# Patient Record
Sex: MALE | Race: WHITE | HISPANIC OR LATINO | URBAN - METROPOLITAN AREA
[De-identification: names, ages, dates, MRNs, and addresses within clinical notes are randomized per-mention and may not be internally consistent; named-entity substitution may affect disease eponyms.]

---

## 2023-03-15 ENCOUNTER — EMERGENCY (EMERGENCY)
Facility: HOSPITAL | Age: 38
LOS: 1 days | Discharge: HOME | End: 2023-03-15
Attending: STUDENT IN AN ORGANIZED HEALTH CARE EDUCATION/TRAINING PROGRAM | Admitting: STUDENT IN AN ORGANIZED HEALTH CARE EDUCATION/TRAINING PROGRAM
Payer: MEDICAID

## 2023-03-15 VITALS
RESPIRATION RATE: 16 BRPM | SYSTOLIC BLOOD PRESSURE: 117 MMHG | OXYGEN SATURATION: 99 % | WEIGHT: 145.06 LBS | HEART RATE: 80 BPM | TEMPERATURE: 99 F | DIASTOLIC BLOOD PRESSURE: 76 MMHG

## 2023-03-15 VITALS
DIASTOLIC BLOOD PRESSURE: 76 MMHG | RESPIRATION RATE: 16 BRPM | HEART RATE: 98 BPM | SYSTOLIC BLOOD PRESSURE: 127 MMHG | OXYGEN SATURATION: 96 % | TEMPERATURE: 99 F

## 2023-03-15 LAB
ALBUMIN SERPL ELPH-MCNC: 3.7 G/DL — SIGNIFICANT CHANGE UP (ref 3.3–5)
ALP SERPL-CCNC: 77 U/L — SIGNIFICANT CHANGE UP (ref 40–120)
ALT FLD-CCNC: 36 U/L — SIGNIFICANT CHANGE UP (ref 12–78)
AMYLASE P1 CFR SERPL: 38 U/L — SIGNIFICANT CHANGE UP (ref 25–125)
ANION GAP SERPL CALC-SCNC: 5 MMOL/L — SIGNIFICANT CHANGE UP (ref 5–17)
APPEARANCE UR: CLEAR — SIGNIFICANT CHANGE UP
AST SERPL-CCNC: 21 U/L — SIGNIFICANT CHANGE UP (ref 15–37)
BACTERIA # UR AUTO: ABNORMAL
BASOPHILS # BLD AUTO: 0.02 K/UL — SIGNIFICANT CHANGE UP (ref 0–0.2)
BASOPHILS NFR BLD AUTO: 0.2 % — SIGNIFICANT CHANGE UP (ref 0–2)
BILIRUB SERPL-MCNC: 0.7 MG/DL — SIGNIFICANT CHANGE UP (ref 0.2–1.2)
BILIRUB UR-MCNC: NEGATIVE — SIGNIFICANT CHANGE UP
BUN SERPL-MCNC: 15 MG/DL — SIGNIFICANT CHANGE UP (ref 7–23)
CALCIUM SERPL-MCNC: 7.9 MG/DL — LOW (ref 8.5–10.1)
CHLORIDE SERPL-SCNC: 108 MMOL/L — SIGNIFICANT CHANGE UP (ref 96–108)
CO2 SERPL-SCNC: 22 MMOL/L — SIGNIFICANT CHANGE UP (ref 22–31)
COLOR SPEC: SIGNIFICANT CHANGE UP
CREAT SERPL-MCNC: 0.75 MG/DL — SIGNIFICANT CHANGE UP (ref 0.5–1.3)
DIFF PNL FLD: ABNORMAL
EGFR: 118 ML/MIN/1.73M2 — SIGNIFICANT CHANGE UP
EOSINOPHIL # BLD AUTO: 0.01 K/UL — SIGNIFICANT CHANGE UP (ref 0–0.5)
EOSINOPHIL NFR BLD AUTO: 0.1 % — SIGNIFICANT CHANGE UP (ref 0–6)
EPI CELLS # UR: NEGATIVE — SIGNIFICANT CHANGE UP
GLUCOSE SERPL-MCNC: 116 MG/DL — HIGH (ref 70–99)
GLUCOSE UR QL: NEGATIVE — SIGNIFICANT CHANGE UP
HCT VFR BLD CALC: 45.6 % — SIGNIFICANT CHANGE UP (ref 39–50)
HGB BLD-MCNC: 15.1 G/DL — SIGNIFICANT CHANGE UP (ref 13–17)
IMM GRANULOCYTES NFR BLD AUTO: 0.5 % — SIGNIFICANT CHANGE UP (ref 0–0.9)
KETONES UR-MCNC: NEGATIVE — SIGNIFICANT CHANGE UP
LEUKOCYTE ESTERASE UR-ACNC: NEGATIVE — SIGNIFICANT CHANGE UP
LIDOCAIN IGE QN: 81 U/L — SIGNIFICANT CHANGE UP (ref 73–393)
LYMPHOCYTES # BLD AUTO: 0.73 K/UL — LOW (ref 1–3.3)
LYMPHOCYTES # BLD AUTO: 6.7 % — LOW (ref 13–44)
MCHC RBC-ENTMCNC: 29.5 PG — SIGNIFICANT CHANGE UP (ref 27–34)
MCHC RBC-ENTMCNC: 33.1 GM/DL — SIGNIFICANT CHANGE UP (ref 32–36)
MCV RBC AUTO: 89.1 FL — SIGNIFICANT CHANGE UP (ref 80–100)
MONOCYTES # BLD AUTO: 0.84 K/UL — SIGNIFICANT CHANGE UP (ref 0–0.9)
MONOCYTES NFR BLD AUTO: 7.7 % — SIGNIFICANT CHANGE UP (ref 2–14)
NEUTROPHILS # BLD AUTO: 9.3 K/UL — HIGH (ref 1.8–7.4)
NEUTROPHILS NFR BLD AUTO: 84.8 % — HIGH (ref 43–77)
NITRITE UR-MCNC: NEGATIVE — SIGNIFICANT CHANGE UP
NRBC # BLD: 0 /100 WBCS — SIGNIFICANT CHANGE UP (ref 0–0)
PH UR: 6.5 — SIGNIFICANT CHANGE UP (ref 5–8)
PLATELET # BLD AUTO: 249 K/UL — SIGNIFICANT CHANGE UP (ref 150–400)
POTASSIUM SERPL-MCNC: 3.4 MMOL/L — LOW (ref 3.5–5.3)
POTASSIUM SERPL-SCNC: 3.4 MMOL/L — LOW (ref 3.5–5.3)
PROT SERPL-MCNC: 7.7 G/DL — SIGNIFICANT CHANGE UP (ref 6–8.3)
PROT UR-MCNC: NEGATIVE — SIGNIFICANT CHANGE UP
RAPID RVP RESULT: SIGNIFICANT CHANGE UP
RBC # BLD: 5.12 M/UL — SIGNIFICANT CHANGE UP (ref 4.2–5.8)
RBC # FLD: 12.1 % — SIGNIFICANT CHANGE UP (ref 10.3–14.5)
RBC CASTS # UR COMP ASSIST: SIGNIFICANT CHANGE UP /HPF (ref 0–4)
SARS-COV-2 RNA SPEC QL NAA+PROBE: SIGNIFICANT CHANGE UP
SODIUM SERPL-SCNC: 135 MMOL/L — SIGNIFICANT CHANGE UP (ref 135–145)
SP GR SPEC: 1 — LOW (ref 1.01–1.02)
UROBILINOGEN FLD QL: NEGATIVE — SIGNIFICANT CHANGE UP
WBC # BLD: 10.95 K/UL — HIGH (ref 3.8–10.5)
WBC # FLD AUTO: 10.95 K/UL — HIGH (ref 3.8–10.5)
WBC UR QL: SIGNIFICANT CHANGE UP

## 2023-03-15 PROCEDURE — 80053 COMPREHEN METABOLIC PANEL: CPT

## 2023-03-15 PROCEDURE — 81001 URINALYSIS AUTO W/SCOPE: CPT

## 2023-03-15 PROCEDURE — 96361 HYDRATE IV INFUSION ADD-ON: CPT

## 2023-03-15 PROCEDURE — 82150 ASSAY OF AMYLASE: CPT

## 2023-03-15 PROCEDURE — 74177 CT ABD & PELVIS W/CONTRAST: CPT | Mod: 26,MA

## 2023-03-15 PROCEDURE — 85025 COMPLETE CBC W/AUTO DIFF WBC: CPT

## 2023-03-15 PROCEDURE — 96374 THER/PROPH/DIAG INJ IV PUSH: CPT | Mod: XU

## 2023-03-15 PROCEDURE — 87086 URINE CULTURE/COLONY COUNT: CPT

## 2023-03-15 PROCEDURE — 99053 MED SERV 10PM-8AM 24 HR FAC: CPT

## 2023-03-15 PROCEDURE — 99284 EMERGENCY DEPT VISIT MOD MDM: CPT | Mod: 25

## 2023-03-15 PROCEDURE — 36415 COLL VENOUS BLD VENIPUNCTURE: CPT

## 2023-03-15 PROCEDURE — 0225U NFCT DS DNA&RNA 21 SARSCOV2: CPT

## 2023-03-15 PROCEDURE — 83690 ASSAY OF LIPASE: CPT

## 2023-03-15 PROCEDURE — 99285 EMERGENCY DEPT VISIT HI MDM: CPT

## 2023-03-15 PROCEDURE — 96375 TX/PRO/DX INJ NEW DRUG ADDON: CPT

## 2023-03-15 PROCEDURE — 74177 CT ABD & PELVIS W/CONTRAST: CPT | Mod: MA

## 2023-03-15 RX ORDER — ONDANSETRON 8 MG/1
4 TABLET, FILM COATED ORAL ONCE
Refills: 0 | Status: COMPLETED | OUTPATIENT
Start: 2023-03-15 | End: 2023-03-15

## 2023-03-15 RX ORDER — SODIUM CHLORIDE 9 MG/ML
1000 INJECTION INTRAMUSCULAR; INTRAVENOUS; SUBCUTANEOUS ONCE
Refills: 0 | Status: COMPLETED | OUTPATIENT
Start: 2023-03-15 | End: 2023-03-15

## 2023-03-15 RX ORDER — MORPHINE SULFATE 50 MG/1
4 CAPSULE, EXTENDED RELEASE ORAL ONCE
Refills: 0 | Status: DISCONTINUED | OUTPATIENT
Start: 2023-03-15 | End: 2023-03-15

## 2023-03-15 RX ORDER — POTASSIUM CHLORIDE 20 MEQ
40 PACKET (EA) ORAL ONCE
Refills: 0 | Status: COMPLETED | OUTPATIENT
Start: 2023-03-15 | End: 2023-03-15

## 2023-03-15 RX ADMIN — MORPHINE SULFATE 4 MILLIGRAM(S): 50 CAPSULE, EXTENDED RELEASE ORAL at 04:27

## 2023-03-15 RX ADMIN — SODIUM CHLORIDE 1000 MILLILITER(S): 9 INJECTION INTRAMUSCULAR; INTRAVENOUS; SUBCUTANEOUS at 03:06

## 2023-03-15 RX ADMIN — SODIUM CHLORIDE 1000 MILLILITER(S): 9 INJECTION INTRAMUSCULAR; INTRAVENOUS; SUBCUTANEOUS at 01:00

## 2023-03-15 RX ADMIN — Medication 40 MILLIEQUIVALENT(S): at 04:51

## 2023-03-15 RX ADMIN — SODIUM CHLORIDE 1000 MILLILITER(S): 9 INJECTION INTRAMUSCULAR; INTRAVENOUS; SUBCUTANEOUS at 02:00

## 2023-03-15 RX ADMIN — SODIUM CHLORIDE 1000 MILLILITER(S): 9 INJECTION INTRAMUSCULAR; INTRAVENOUS; SUBCUTANEOUS at 04:51

## 2023-03-15 RX ADMIN — ONDANSETRON 4 MILLIGRAM(S): 8 TABLET, FILM COATED ORAL at 03:07

## 2023-03-15 RX ADMIN — MORPHINE SULFATE 4 MILLIGRAM(S): 50 CAPSULE, EXTENDED RELEASE ORAL at 03:06

## 2023-03-15 NOTE — ED ADULT NURSE NOTE - OBJECTIVE STATEMENT
received pt stable alert oriented x4 no distress c/o abd pain blood work drawned and ivf in progress awaiting MD evaluation

## 2023-03-15 NOTE — ED ADULT NURSE NOTE - IS THE PATIENT ABLE TO BE SCREENED?
+toleration of puree laced w/ blue dye w/o immediate overt s/s of penetration/aspiration. No blue dye noted in tracheal secretions upon suctioning post po trials. Yes

## 2023-03-15 NOTE — ED PROVIDER NOTE - CHPI ED SYMPTOMS NEG
no abdominal distension/no blood in stool/no dysuria/no fever/no hematuria/no vomiting/no burning urination

## 2023-03-15 NOTE — ED PROVIDER NOTE - GASTROINTESTINAL, MLM
Abdomen soft, non-distended, generalized periumbilical tenderness, no rebound, no guarding. +BS, no CVA tenderness, no pulsatile mass

## 2023-03-15 NOTE — ED PROVIDER NOTE - PATIENT PORTAL LINK FT
You can access the FollowMyHealth Patient Portal offered by [facility] by registering at the following website: [email or web address]. By joining [employer]’s FollowMyHealth portal, you will also be able to view your health information using other applications (apps) compatible with our system.

## 2023-03-15 NOTE — ED PROVIDER NOTE - NSFOLLOWUPINSTRUCTIONS_ED_ALL_ED_FT
Please drink plenty of clears.  Do not return to the ER for persistent abdominal pain, vomiting, fevers, dehydration, inability to tolerate liquids, or any other concerns.       Gastroenteritis    LO QUE NECESITA SABER:    ¿Qué es la gastroenteritis?La gastroenteritis, o gripe estomacal, es sohan infección del estómago y los intestinos. Es provocada por sohan bacteria, parásitos o virus.  Tracto digestivo         ¿Qué aumenta mi riesgo de gastroenteritis?  •Contacto cercano con sohan persona o animal infectado      •Intoxicación alimentaria, shavon de huevos, verduras crudas, mariscos o carne que no está cocinada completamente      •Grant agua contaminada, shavon al acampar o salir de viaje      ¿Cuáles son los signos y síntomas de la gastroenteritis?  •Diarrea o gas      •Náusea, vómitos o apetito deficiente      •Calambres, dolor o gorgoteo abdominales      •Fiebre      •Cansancio o debilidad      •Leela de nidhi o musculares con cualquiera de los síntomas anteriores      ¿Cómo se diagnostica y trata la gastroenteritis?Azar médico lo examinará. Buscará signos de deshidratación. Le preguntará con qué frecuencia ha tenido vómitos o diarrea. Usted podría necesitar que le tomen sohan muestra de brandy o de evacuaciones intestinales para analizarla y determinar si contiene el germen causante de azar gastroenteritis. La gastroenteritis con frecuencia se jereimah por sí micky. Es posible que le den medicamento para reducir o detener la diarrea y los vómitos. Es posible que también necesite medicamentos para tratar sohan infección a causa de sohan bacteria o parásitos.    ¿Cómo puedo controlar mi gastroenteritis?  •Brogden líquidos shavon se le haya indicado.Pregunte a azar médico qué cantidad de líquido debe beber a diario y qué líquidos le recomienda. Es posible que también necesite grant sohan solución de rehidratación oral (SRO). Sohan SRO contiene la cantidad adecuada de azúcar, sal y minerales en agua para reponer los líquidos corporales.      •Consuma alimentos blandos.Cuando usted sienta hambre, empiece a comer alimentos suaves y blandos. Ejemplos son los plátanos, sopas claras, corina y puré de manzana. No consuma productos lácteos, alcohol, bebidas azucaradas, o bebidas con cafeína hasta que se sienta mejor.      •Descanse el mayor tiempo posible.Cuando se empiece a sentir mejor, comience poco a poco a hacer más cada día.      ¿Cómo puedo evitar la gastroenteritis?La gastroenteritis se puede propagar fácilmente. Manténgase usted, azar cecilio y glendy alrededores limpios para ayudar a evitar la propagación de la gastroenteritis:   •Lávese las jose frecuentemente.Utilice agua y jabón. Lávese las jose después de usar el baño, cambiarle el pañal a un navdeep o estornudar. Lávese las jose antes de comer o preparar alimentos.   Lavado de jose           •Limpie las superficies y lave la ropa con frecuencia.Lave azar ropa y glendy toallas por separado del christiano de la ropa. Limpie las superficies de azar hogar con limpiador antibacterial o con blanqueador.      •Lave y cocine juno los alimentos.Lave las verduras crudas antes de cocinar. Cocine juno las naty, pescados y huevos. No utilice los mismos platos para las naty crudas que para otros alimentos. Ponga en el refrigerador inmediatamente cualquier alimento que haya sobrado.      •Esté alerta cuando usted vaya de campamento o cuando viaje.Solamente tome agua limpia. No tome agua de los luis manuel o maisha a menos que usted purifique o hierva el agua alisa. Cuando viaje, tome agua embotellada y no le ponga hielo. No coma fruta con la cáscara. No coma pescado crudo o naty que no están cocinadas completamente.      Llame al 911 en radha de presentar lo siguiente:  •Usted tiene dificultad para respirar o pulso acelerado.          ¿Cuándo vianney buscar atención inmediata?  •Usted ve brandy en azar diarrea.      •Usted no puede dejar de vomitar.      •Usted no ha orinado en 12 horas.      •Usted siente que se va a desmayar.      ¿Cuándo viannye comunicarme con mi médico?  •Tiene fiebre.      •Usted continúa con vómitos o diarrea aún después del tratamiento.      •Usted ve lombrices en azar diarrea.      •Azar boca u ojos están secos. Usted no está orinando tanto o con la misma frecuencia.      •Usted tiene preguntas o inquietudes acerca de azar condición o cuidado.      ACUERDOS SOBRE AZAR CUIDADO:    Usted tiene el derecho de ayudar a planear azar cuidado. Aprenda todo lo que pueda sobre azar condición y shavon darle tratamiento. Discuta glendy opciones de tratamiento con glendy médicos para decidir el cuidado que usted desea recibir. Usted siempre tiene el derecho de rechazar el tratamiento.

## 2023-03-15 NOTE — ED PROVIDER NOTE - CLINICAL SUMMARY MEDICAL DECISION MAKING FREE TEXT BOX
38 year old male p/w diarrhea, chills, generalized abdominal cramping since yesterday.  No fever, vomiting, sick contacts.  He is tolerating PO.  Check labs, UA, CT abd/pelvis, hydrate, analgesia, consult GI/surgery as needed

## 2023-03-15 NOTE — ED ADULT NURSE NOTE - NSIMPLEMENTINTERV_GEN_ALL_ED
Implemented All Fall Risk Interventions:  [place] to call system. Call bell, personal items and telephone within reach. Instruct patient to call for assistance. Room bathroom lighting operational. Non-slip footwear when patient is off stretcher. Physically safe environment: no spills, clutter or unnecessary equipment. Stretcher in lowest position, wheels locked, appropriate side rails in place. Provide visual cue, wrist band, yellow gown, etc. Monitor gait and stability. Monitor for mental status changes and reorient to person, place, and time. Review medications for side effects contributing to fall risk. Reinforce activity limits and safety measures with patient and family.

## 2023-03-15 NOTE — ED ADULT NURSE REASSESSMENT NOTE - NS ED NURSE REASSESS COMMENT FT1
pt reavaluated and medicated with potassium po as ordered and vital signs stable awaiting D/C home to follow up

## 2023-03-15 NOTE — ED ADULT TRIAGE NOTE - CHIEF COMPLAINT QUOTE
38 yr old male c/o abdominal pain, diarrhea, and fever since 3 am yesterday morning. Denies known sick contacts, recent travel. " I have been sick since 3 am yesterday and all that comes out is water, water, water."

## 2023-03-15 NOTE — ED PROVIDER NOTE - PROGRESS NOTE DETAILS
Results of work up d/w patient using  [id number].  Copies of all reports given.  Diet instructions discussed and GI referral. Patient looks well, abd continues to be soft, NT/ND.

## 2023-03-15 NOTE — ED ADULT NURSE NOTE - CHIEF COMPLAINT
Detail Level: Simple
Include Location In Plan?: No
The patient is a 38y Male complaining of abdominal pain.

## 2023-03-15 NOTE — ED PROVIDER NOTE - DIFFERENTIAL DIAGNOSIS
Ddx includes but is not limited to AGE, colitis, renal colic, pyelonephritis, IBD, IBS, diverticulitis, mesenteric ishcemia Differential Diagnosis

## 2023-03-15 NOTE — ED PROVIDER NOTE - OBJECTIVE STATEMENT
38 year old male with no significant PMH presents with abdominal pain and diarrhea.  History provided through  ID# [id number].  Patient states around 3am yesterday morning, he developed diarrhea, generalized abdominal cramping, and chills.  The pain does not radiate to his back, flank or chest. He report 10-20 episodes of non-bloody diarrhea since the sx started.  Denies fever, vomiting, dysuria, hematuria.  He took Pepto-Bismol and Tylenol without relief.  No foreign travel or recent antibiotic use, no sick contacts.  He is tolerating fluids. No PMD

## 2023-03-15 NOTE — ED PROVIDER NOTE - CARE PROVIDER_API CALL
Kwabena Andrade ()  Internal Medicine  [address] [place] [address]  Phone: [phone]  Fax: [phone]  Follow Up Time:

## 2023-03-16 LAB
CULTURE RESULTS: NO GROWTH — SIGNIFICANT CHANGE UP
SPECIMEN SOURCE: SIGNIFICANT CHANGE UP